# Patient Record
Sex: FEMALE | URBAN - METROPOLITAN AREA
[De-identification: names, ages, dates, MRNs, and addresses within clinical notes are randomized per-mention and may not be internally consistent; named-entity substitution may affect disease eponyms.]

---

## 2020-05-15 ENCOUNTER — EMERGENCY (EMERGENCY)
Facility: HOSPITAL | Age: 35
LOS: 1 days | Discharge: HOME | End: 2020-05-15
Attending: EMERGENCY MEDICINE
Payer: SELF-PAY

## 2020-05-15 VITALS
WEIGHT: 119.93 LBS | OXYGEN SATURATION: 97 % | RESPIRATION RATE: 17 BRPM | HEIGHT: 63 IN | HEART RATE: 90 BPM | TEMPERATURE: 99 F | SYSTOLIC BLOOD PRESSURE: 95 MMHG | DIASTOLIC BLOOD PRESSURE: 68 MMHG

## 2020-05-15 DIAGNOSIS — F12.929 CANNABIS USE, UNSPECIFIED WITH INTOXICATION, UNSPECIFIED: ICD-10-CM

## 2020-05-15 DIAGNOSIS — F48.9 NONPSYCHOTIC MENTAL DISORDER, UNSPECIFIED: ICD-10-CM

## 2020-05-15 DIAGNOSIS — F20.0 PARANOID SCHIZOPHRENIA: ICD-10-CM

## 2020-05-15 LAB
ALBUMIN SERPL ELPH-MCNC: 4.4 G/DL — SIGNIFICANT CHANGE UP (ref 3.3–5.2)
ALP SERPL-CCNC: 67 U/L — SIGNIFICANT CHANGE UP (ref 40–120)
ALT FLD-CCNC: 24 U/L — SIGNIFICANT CHANGE UP
AMPHET UR-MCNC: NEGATIVE — SIGNIFICANT CHANGE UP
ANION GAP SERPL CALC-SCNC: 12 MMOL/L — SIGNIFICANT CHANGE UP (ref 5–17)
AST SERPL-CCNC: 49 U/L — HIGH
BARBITURATES UR SCN-MCNC: NEGATIVE — SIGNIFICANT CHANGE UP
BENZODIAZ UR-MCNC: NEGATIVE — SIGNIFICANT CHANGE UP
BILIRUB SERPL-MCNC: <0.2 MG/DL — LOW (ref 0.4–2)
BUN SERPL-MCNC: 15 MG/DL — SIGNIFICANT CHANGE UP (ref 8–20)
CALCIUM SERPL-MCNC: 9.9 MG/DL — SIGNIFICANT CHANGE UP (ref 8.6–10.2)
CHLORIDE SERPL-SCNC: 101 MMOL/L — SIGNIFICANT CHANGE UP (ref 98–107)
CO2 SERPL-SCNC: 24 MMOL/L — SIGNIFICANT CHANGE UP (ref 22–29)
COCAINE METAB.OTHER UR-MCNC: POSITIVE
CREAT SERPL-MCNC: 1.42 MG/DL — HIGH (ref 0.5–1.3)
ETHANOL SERPL-MCNC: <10 MG/DL — SIGNIFICANT CHANGE UP
GLUCOSE SERPL-MCNC: 104 MG/DL — HIGH (ref 70–99)
HCG SERPL-ACNC: <4 MIU/ML — SIGNIFICANT CHANGE UP
HCT VFR BLD CALC: 40.4 % — SIGNIFICANT CHANGE UP (ref 34.5–45)
HGB BLD-MCNC: 13.4 G/DL — SIGNIFICANT CHANGE UP (ref 11.5–15.5)
MCHC RBC-ENTMCNC: 28.5 PG — SIGNIFICANT CHANGE UP (ref 27–34)
MCHC RBC-ENTMCNC: 33.2 GM/DL — SIGNIFICANT CHANGE UP (ref 32–36)
MCV RBC AUTO: 85.8 FL — SIGNIFICANT CHANGE UP (ref 80–100)
METHADONE UR-MCNC: NEGATIVE — SIGNIFICANT CHANGE UP
OPIATES UR-MCNC: NEGATIVE — SIGNIFICANT CHANGE UP
PCP SPEC-MCNC: SIGNIFICANT CHANGE UP
PCP UR-MCNC: NEGATIVE — SIGNIFICANT CHANGE UP
PLATELET # BLD AUTO: 355 K/UL — SIGNIFICANT CHANGE UP (ref 150–400)
POTASSIUM SERPL-MCNC: 4.2 MMOL/L — SIGNIFICANT CHANGE UP (ref 3.5–5.3)
POTASSIUM SERPL-SCNC: 4.2 MMOL/L — SIGNIFICANT CHANGE UP (ref 3.5–5.3)
PROT SERPL-MCNC: 7.5 G/DL — SIGNIFICANT CHANGE UP (ref 6.6–8.7)
RBC # BLD: 4.71 M/UL — SIGNIFICANT CHANGE UP (ref 3.8–5.2)
RBC # FLD: 14.2 % — SIGNIFICANT CHANGE UP (ref 10.3–14.5)
SALICYLATES SERPL-MCNC: <0.6 MG/DL — LOW (ref 10–20)
SODIUM SERPL-SCNC: 137 MMOL/L — SIGNIFICANT CHANGE UP (ref 135–145)
THC UR QL: NEGATIVE — SIGNIFICANT CHANGE UP
WBC # BLD: 13.65 K/UL — HIGH (ref 3.8–10.5)
WBC # FLD AUTO: 13.65 K/UL — HIGH (ref 3.8–10.5)

## 2020-05-15 PROCEDURE — 80053 COMPREHEN METABOLIC PANEL: CPT

## 2020-05-15 PROCEDURE — 85027 COMPLETE CBC AUTOMATED: CPT

## 2020-05-15 PROCEDURE — 99284 EMERGENCY DEPT VISIT MOD MDM: CPT

## 2020-05-15 PROCEDURE — 90792 PSYCH DIAG EVAL W/MED SRVCS: CPT

## 2020-05-15 PROCEDURE — 80307 DRUG TEST PRSMV CHEM ANLYZR: CPT

## 2020-05-15 PROCEDURE — 84702 CHORIONIC GONADOTROPIN TEST: CPT

## 2020-05-15 PROCEDURE — 36415 COLL VENOUS BLD VENIPUNCTURE: CPT

## 2020-05-15 RX ORDER — DIPHENHYDRAMINE HCL 50 MG
50 CAPSULE ORAL EVERY 6 HOURS
Refills: 0 | Status: DISCONTINUED | OUTPATIENT
Start: 2020-05-15 | End: 2020-05-20

## 2020-05-15 RX ORDER — QUETIAPINE FUMARATE 200 MG/1
50 TABLET, FILM COATED ORAL ONCE
Refills: 0 | Status: COMPLETED | OUTPATIENT
Start: 2020-05-15 | End: 2020-05-15

## 2020-05-15 RX ORDER — HALOPERIDOL DECANOATE 100 MG/ML
5 INJECTION INTRAMUSCULAR EVERY 6 HOURS
Refills: 0 | Status: DISCONTINUED | OUTPATIENT
Start: 2020-05-15 | End: 2020-05-20

## 2020-05-15 RX ADMIN — Medication 1 MILLIGRAM(S): at 22:37

## 2020-05-15 RX ADMIN — QUETIAPINE FUMARATE 50 MILLIGRAM(S): 200 TABLET, FILM COATED ORAL at 22:37

## 2020-05-15 NOTE — ED PROVIDER NOTE - PROGRESS NOTE DETAILS
Patient stating now that she wants to hurt herself and others, called  and they will evaluate patient AJM: pt medically cleared. cleared by . stable for dc
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
## 2020-05-15 NOTE — ED BEHAVIORAL HEALTH ASSESSMENT NOTE - ACTIVATING EVENTS/STRESSORS
Current or pending social isolation/Substance intoxication or withdrawal/Pending incarceration or homelessness

## 2020-05-15 NOTE — ED PROVIDER NOTE - OBJECTIVE STATEMENT
Patient is a 34 year old female with pmhx of schizophrenia who presented after being found wandering the streets. patient states was smoking K2 over past two days and felt confused was wondering to find home. patient lives in shelter. patient currently in ER calm, A&Ox3 denies suicidal/harmful thoughts
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
## 2020-05-15 NOTE — ED BEHAVIORAL HEALTH ASSESSMENT NOTE - OTHER PAST PSYCHIATRIC HISTORY (INCLUDE DETAILS REGARDING ONSET, COURSE OF ILLNESS, INPATIENT/OUTPATIENT TREATMENT)
ACT team, monthly Haldol ATKINSON  Pt reports one psych admissions approx 10 yrs ago.  Brother reports [facility] psych approx 5 yrs ago
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
## 2020-05-15 NOTE — ED BEHAVIORAL HEALTH ASSESSMENT NOTE - SUICIDE RISK FACTORS
Psychotic disorder current/past/Alcohol/Substance abuse disorders/Recent onset of current/past psychiatric diagnosis
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
## 2020-05-15 NOTE — ED PROVIDER NOTE - CLINICAL SUMMARY MEDICAL DECISION MAKING FREE TEXT BOX
patient is a 34 year old female who was AMS after smoking K2, in ER Alert  will draw labs and re eval

## 2020-05-15 NOTE — ED BEHAVIORAL HEALTH ASSESSMENT NOTE - SUMMARY
34 yoAA female, , no children, non caretaker role, domiciled in shelter, unemployed, PPH Schizophrenia, followed by ACT team, on Haldol ATKINSON monthly, one prior psych admissions 10 yrs ago after smoking K2, no h/o suicide attempt, denies using K2 2 until today, denies other drug use, (tox pos cocaine only), PMH NIDDM, bib EMS for bizarre behavior.

## 2020-05-15 NOTE — ED ADULT NURSE NOTE - NSIMPLEMENTINTERV_GEN_ALL_ED
Implemented All Fall Risk Interventions:  [place] to call system. Call bell, personal items and telephone within reach. Instruct patient to call for assistance. Room bathroom lighting operational. Non-slip footwear when patient is off stretcher. Physically safe environment: no spills, clutter or unnecessary equipment. Stretcher in lowest position, wheels locked, appropriate side rails in place. Provide visual cue, wrist band, yellow gown, etc. Monitor gait and stability. Monitor for mental status changes and reorient to person, place, and time. Review medications for side effects contributing to fall risk. Reinforce activity limits and safety measures with patient and family.

## 2020-05-15 NOTE — ED ADULT TRIAGE NOTE - CHIEF COMPLAINT QUOTE
biba from street - found wandering, admits to "smoking k2 x 3 times"  + dellusions + hallucinations denies suicidal/homicidal ideations

## 2020-05-15 NOTE — ED ADULT NURSE NOTE - OBJECTIVE STATEMENT
see reassess Pt. brought in found wandering in the streets after taking K2 at 1pm.  Pt. admits to visual hallucinations that were "making me run in front of cars and shit and making me do things that could hurt myself."  Pt. states on arrival to ED she is no longer experiencing hallucinations and at this time does not have any thoughts of hurting self or others.  Pt. states she has taken K2 in the past and each time it affects her the same way.  Pt. denies  any other drug or alcohol use.  Pt. in yellow gown and belongings secured away from pt. for safety.  No obvious trauma or injury noted on pt.  Moving all extremities freely and with ease.

## 2020-05-15 NOTE — ED PROVIDER NOTE - PATIENT PORTAL LINK FT
You can access the FollowMyHealth Patient Portal offered by [facility] by registering at the following website: [email or web address]. By joining [employer]’s FollowMyHealth portal, you will also be able to view your health information using other applications (apps) compatible with our system.

## 2020-05-15 NOTE — ED PROVIDER NOTE - ATTENDING CONTRIBUTION TO CARE
I performed a face to face history and physical exam of the patient and discussed their management with the resident/ACP. I reviewed the resident/ACP's note and agree with the documented findings and plan of care.    Pt was brought in for bizarre behavior found wandering. Pt admits to using K2 today and yesterday for the first time in several months. Pt denies any other complaints currently.    physical - rrr. ctab. abd - soft, nt. no edema. no rash.    plan - labs ordered. Pt likely acting abnormally 2/2 drug intoxication. PT to be followed by [facility] pending labs and sobriety.

## 2020-05-16 VITALS
RESPIRATION RATE: 18 BRPM | DIASTOLIC BLOOD PRESSURE: 71 MMHG | OXYGEN SATURATION: 97 % | SYSTOLIC BLOOD PRESSURE: 107 MMHG | HEART RATE: 90 BPM | TEMPERATURE: 98 F

## 2020-05-16 PROCEDURE — 99211 OFF/OP EST MAY X REQ PHY/QHP: CPT

## 2020-05-16 NOTE — ED ADULT NURSE REASSESSMENT NOTE - NS ED NURSE REASSESS COMMENT FT1
pt has odd behavior sitting on floor.  blunt looks at time.
received pt awake alert. somewhat disorganized.   as per pt I took k2 and I started bugging out.  I was walking around in street then I started to see smoke and I was trying to get away from it I walking in front of a car.  I wasn't trying to kill myself I am not suicidal.  I was just bugging out on k2 and I jumped in front of a car. pt states h/i  I feel like people are after me.   but  at no one. admits to hx od schizophrenia.  followed by act team. denies avh.
Assumed care of patient at 0715.  Patient awake sitting up in bed.  Patient oriented to staff and educated about plan of care.  Patient endorsed feeling slightly confused but is oriented times four.  No attempts to harm self or others and safety maintained.

## 2020-05-16 NOTE — CHART NOTE - NSCHARTNOTEFT_GEN_A_CORE
As per MD, patient is medically stable for discharge. As per BERTO Melgoza patient is treat and release and has follow up in place with the ACT team. SW met with patient at bedside and provided patient with substance abuse resources.

## 2020-05-16 NOTE — ED BEHAVIORAL HEALTH NOTE - BEHAVIORAL HEALTH NOTE
PROGRESS NOTE: 05-16-20 @ 11:53  	  • Reason for Ongoing Consultation: 	    ID: 34yyo Female with HEALTH ISSUES - PROBLEM Dx:  Deferred condition on axis II  Synthetic cannabinoid intoxication  Paranoid schizophrenia          INTERVAL DATA:   • Interval Chief Complaint: "I slept well.  No I don't have any of that.."  • Interval History: On follow up, Pt reports she slept well with Seroquel 50mg.  Pt denies SI/HI and states the thoughts of HI yesterday was from paranoia, but has no intention of hurting anyone andf no longer is feeling paranoid.  Pt asking to be discharged and asked for resources for substance abuse tx.  Pt states she is done smoking MJ due to paranoia.  Pt denies AH/VH and delusions.  Pt denies depression, SIIP or need for inpt psych admission.  Attempted contact with ACT to clarify meds and next ATKINSON shot but Pt does not know name of ACT team.  I contacted [facility] and [facility] but she is not with them.    Spoke with brother yesterday who denies recent suicide attempt, aggression and acute psych condition which require psych admission.    REVIEW OF SYSTEMS:   • Constitutional Symptoms	No complaints  • Eyes	No complaints  • Ears / Nose / Throat / Mouth	No complaints  • Cardiovascular	No complaints  • Respiratory	No complaints  • Gastrointestinal	No complaints  • Genitourinary	No complaints  • Musculoskeletal	No complaints  • Skin	No complaints  • Neurological	No complaints  • Psychiatric (see HPI)	See HPI  • Endocrine	No complaints  • Hematologic / Lymphatic	No complaints  • Allergic / Immunologic	No complaints    REVIEW OF VITALS/LABS/IMAGING/INVESTIGATIONS:   • Vital signs reviewed: Yes  • Vital Signs:	    T(C): 36.9 (05-16-20 @ 11:20), Max: 37.4 (05-15-20 @ 16:47)  HR: 90 (05-16-20 @ 11:20) (88 - 99)  BP: 107/71 (05-16-20 @ 11:20) (95/68 - 134/85)  RR: 18 (05-16-20 @ 11:20) (17 - 20)  SpO2: 97% (05-16-20 @ 11:20) (97% - 100%)    • Available labs reviewed: Yes  • Available Lab Results:                           13.4   13.65 )-----------( 355      ( 15 May 2020 18:47 )             40.4     05-15    137  |  101  |  15.0  ----------------------------<  104<H>  4.2   |  24.0  |  1.42<H>    Ca    9.9      15 May 2020 18:47    TPro  7.5  /  Alb  4.4  /  TBili  <0.2<L>  /  DBili  x   /  AST  49<H>  /  ALT  24  /  AlkPhos  67  05-15    LIVER FUNCTIONS - ( 15 May 2020 18:47 )  Alb: 4.4 g/dL / Pro: 7.5 g/dL / ALK PHOS: 67 U/L / ALT: 24 U/L / AST: 49 U/L / GGT: x                   MEDICATIONS:      PRN Medications:Seroquel 50 hs  • PRN Medications since last evaluation	  • PRN Details	    Current Medications:   diphenhydrAMINE   Injectable 50 milliGRAM(s) IntraMuscular every 6 hours PRN  haloperidol    Injectable 5 milliGRAM(s) IntraMuscular every 6 hours PRN  LORazepam   Injectable 2 milliGRAM(s) IntraMuscular every 6 hours PRN     Medication Side Effects:  • Medication Side Effects or Adverse Reactions (new or ongoing)	None known    MENTAL STATUS EXAM:   • Level of Consciousness	Alert  • General Appearance	Well developed  • Body Habitus	Well nourished  • Hygiene	fair  • Grooming	fair  • Behavior	Cooperative  • Eye Contact	Good  • Relatedness	Good  • Impulse Control	Normal  • Muscle Tone / Strength	Normal muscle tone/strength  • Abnormal Movements	No abnormal movements  • Gait / Station	Normal gait / station  • Speech Volume	Normal  • Speech Rate	Normal  • Speech Spontaneity	Normal  • Speech Articulation	Normal  • Mood	anxious  • Affect Quality	anxious  • Affect Range contricted  • Affect Congruence	Congruent  • Thought Process	Linear  • Thought Associations	Normal  • Thought Content	Unremarkable  • Perceptions	No abnormalities  • Oriented to Time	Yes  • Oriented to Place	Yes  • Oriented to Situation	Yes  • Oriented to Person	Yes  • Attention / Concentration	Normal  • Estimated Intelligence	Average  • Recent Memory	Normal  • Remote Memory	Normal  • Fund of Knowledge	Normal  • Language	No abnormalities noted  • Judgment (regarding everyday events)	Fair  • Insight (regarding psychiatric illness)	Fair    SUICIDALITY:   • Suicidality (Interval)	none known    HOMICIDALITY/AGGRESSION:   • Homicidality/Aggression	none known    DIAGNOSIS DSM-V:    Psychiatric Diagnosis (Corresponds to DSM-IV Axis I, II):   HEALTH ISSUES - PROBLEM Dx:  Deferred condition on axis II  Synthetic cannabinoid intoxication  Paranoid schizophrenia           Medical Diagnosis (Corresponds to DSM-IV Axis III):  • Axis III	NIDDM      ASSESSMENT OF CURRENT CONDITION:   Summary (include case differential, formulation and patient response to therapy):   Pt no longer presenting with paranoia, HI and no evidence of psychosis or gretta.  Pt denies depressed mood, SIIP but has some anxious expression.  Pt requesting discharge and denies need for psych admisison.  Pt reports she will meet with ACT as she regularly does for monthly injection.  Pt is cleared for discharge and is agreeable to follow up with ACT team.    Risk Assessment (consider static vs modifiable risk factors and protective factors; comment on level of risk for dangerous behavior):   RF past SI, HI aggression (remote), current drug use cocaine, k2, homeless.  PF reports compliance with ACT team  PLAN  Cleared for discharge, follow up ACT team.

## 2020-05-16 NOTE — ED ADULT NURSE REASSESSMENT NOTE - COMFORT CARE
ambulated to bathroom
ambulated to bathroom
po fluids offered/snacks provided/ambulated to bathroom
plan of care explained/po fluids offered

## 2022-08-03 NOTE — ED PROVIDER NOTE - DATE/TIME 2
16-May-2020 12:01 Mastoid Interpolation Flap Text: A decision was made to reconstruct the defect utilizing an interpolation axial flap and a staged reconstruction.  A telfa template was made of the defect.  This telfa template was then used to outline the mastoid interpolation flap.  The donor area for the pedicle flap was then injected with anesthesia.  The flap was excised through the skin and subcutaneous tissue down to the layer of the underlying musculature.  The pedicle flap was carefully excised within this deep plane to maintain its blood supply.  The edges of the donor site were undermined.   The donor site was closed in a primary fashion.  The pedicle was then rotated into position and sutured.  Once the tube was sutured into place, adequate blood supply was confirmed with blanching and refill.  The pedicle was then wrapped with xeroform gauze and dressed appropriately with a telfa and gauze bandage to ensure continued blood supply and protect the attached pedicle.

## 2024-01-01 NOTE — ED ADULT NURSE NOTE - CHIEF COMPLAINT QUOTE
biba from street - found wandering, admits to "smoking k2 x 3 times"  + dellusions + hallucinations denies suicidal/homicidal ideations
Vaccine status unknown

## 2024-04-26 NOTE — ED PROVIDER NOTE - NO PERTINENT FAMILY HISTORY
A referral was placed to pediatric Orthopedics.  That department will contact you shortly with the day, time and location.    If any symptoms change prior to your appointment you may return to this location for follow-up.  If Reji experiences pain you may give Tylenol as needed.   <<----- Click to add NO pertinent Family History